# Patient Record
Sex: MALE | Race: WHITE
[De-identification: names, ages, dates, MRNs, and addresses within clinical notes are randomized per-mention and may not be internally consistent; named-entity substitution may affect disease eponyms.]

---

## 2017-02-08 ENCOUNTER — HOSPITAL ENCOUNTER (EMERGENCY)
Dept: HOSPITAL 39 - ER | Age: 27
Discharge: HOME | End: 2017-02-08
Payer: SELF-PAY

## 2017-02-08 VITALS — TEMPERATURE: 97.5 F | SYSTOLIC BLOOD PRESSURE: 141 MMHG | OXYGEN SATURATION: 98 % | DIASTOLIC BLOOD PRESSURE: 91 MMHG

## 2017-02-08 DIAGNOSIS — I25.2: ICD-10-CM

## 2017-02-08 DIAGNOSIS — Z98.61: ICD-10-CM

## 2017-02-08 DIAGNOSIS — Z87.891: ICD-10-CM

## 2017-02-08 DIAGNOSIS — M54.6: Primary | ICD-10-CM

## 2017-02-08 DIAGNOSIS — Z88.6: ICD-10-CM

## 2017-02-08 DIAGNOSIS — D17.1: ICD-10-CM

## 2017-02-08 PROCEDURE — 81001 URINALYSIS AUTO W/SCOPE: CPT

## 2017-02-08 PROCEDURE — 80307 DRUG TEST PRSMV CHEM ANLYZR: CPT

## 2017-02-08 PROCEDURE — 72070 X-RAY EXAM THORAC SPINE 2VWS: CPT

## 2017-02-08 PROCEDURE — 85025 COMPLETE CBC W/AUTO DIFF WBC: CPT

## 2017-02-08 PROCEDURE — 93005 ELECTROCARDIOGRAM TRACING: CPT

## 2017-02-08 PROCEDURE — 36415 COLL VENOUS BLD VENIPUNCTURE: CPT

## 2017-02-08 NOTE — RAD
EXAM DESCRIPTION:

XR THORACIC SPINE 2 VIEWS



CLINICAL HISTORY:

pain



COMPARISON:

None.



TECHNIQUE:

Three views.



FINDINGS:

No evidence for fracture is seen. No dysraphic abnormality is detected.

The pedicles appear intact.



IMPRESSION:

Normal thoracic spine.



Electronically signed by: Ramana Kinney MD 02/08/2017 17:53

## 2017-02-08 NOTE — ED.PDOC
History of Present Illness





- General


Chief Complaint: Back Pain or Injury


Stated Complaint: knot to back


Time Seen by Provider: 02/08/17 17:25


Source: patient


Exam Limitations: no limitations





- History of Present Illness


Timing/Duration: 4-6 hours


Quality/Severity: burning


Back Pain Location: T-spine


Back Pain Radiation: other - none


Method of Injury/Prior Injury: other - none


Improving Factors: nothing


Worsening Factors: nothing


Associated Symptoms: denies symptoms


Allergies/Adverse Reactions: 


Allergies





Hydrocodone Allergy (Verified 02/08/17 16:27)


 








Home Medications: 


Ambulatory Orders





Gabapentin 300 mg PO BID #14 cap 02/08/17 











Review of Systems





- Review of Systems


Constitutional: States: no symptoms reported


EENTM: States: no symptoms reported


Respiratory: States: no symptoms reported


Cardiology: States: no symptoms reported


Gastrointestinal/Abdominal: States: no symptoms reported


Genitourinary: States: no symptoms reported


Musculoskeletal: States: see HPI


Skin: States: no symptoms reported


Neurological: States: no symptoms reported


Endocrine: States: no symptoms reported


Hematologic/Lymphatic: States: no symptoms reported





Past Medical History (General)





- Patient Medical History


Hx Cardiac Disorders: Yes - MI at age 22


Surgical History: other - cardiac stent





- Vaccination History


Hx Tetanus, Diphtheria Vaccination: No


Hx Influenza Vaccination: No


Hx Pneumococcal Vaccination: No


Immunizations Up to Date: Yes





- Social History


Hx Tobacco Use: Yes


Hx Alcohol Use: No


Hx Substance Use: No


Hx Substance Use Treatment: No


Hx Depression: No





- Activities of Daily Living


Hospice Agency (if applicable):: None





- Female History


Patient is a Female of Child Bearing Age (10 -59 yrs old): No


Patient Pregnant: No





Family Medical History





- Family History


  ** Mother


Hx Family Hypertension: Yes


Hx Cardiac Disease: Yes - dad





Physical Exam





- Physical Exam


General Appearance: Alert, No apparent distress


Eyes, Ears, Nose, Throat Exam: PERRL/EOMI, normal ENT inspection, TMs normal


Neck Exam: non-tender, full range of motion, normal alignment, normal inspection


Cardiovascular/Respiratory: regular rate, rhythm, no M/R/G, normal peripheral 

pulses, no JVD, normal breath sounds, no respiratory distress


Gastrointestinal/Abdominal: normal bowel sounds, non tender, soft


Back Exam: normal inspection, no CVA tenderness, no vertebral tenderness


Extremity Exam: no evidence of injury, normal range of motion


Neurologic: no motor/sensory deficits, alert, normal mood/affect, oriented x 3


Skin Exam: normal color, warm/dry, other - lipoma right infrascapular area 2 cm 

dm.





Progress





- Results/Orders


Results/Orders: 


 





02/08/17 17:33


URINE DRUG SCREEN, 7 ASSAY Stat 





02/08/17 18:45


EKG STAT 





02/08/17 19:01


URINALYSIS Stat 








 Laboratory Results











WBC  10.7 K/mm3 (4.8-10.8)   02/08/17  17:58    


 


RBC  5.41 M/mm3 (4.70-6.10)   02/08/17  17:58    


 


Hgb  15.6 gm/dL (14.0-18.0)   02/08/17  17:58    


 


Hct  46.4 % (42.0-52.0)   02/08/17  17:58    


 


MCV  85.7 fl (80.0-94.0)   02/08/17  17:58    


 


MCH  28.8 pg (27.0-31.0)   02/08/17  17:58    


 


MCHC  33.6 g/dL (33.0-37.0)   02/08/17  17:58    


 


RDW  12.7 % (11.5-14.5)   02/08/17  17:58    


 


Plt Count  289 K/mm3 (130-400)   02/08/17  17:58    


 


MPV  8.2 fl (7.40-10.4)   02/08/17  17:58    


 


Absolute Neuts (auto)  6.00 K/uL (1.8-6.8)   02/08/17  17:58    


 


Absolute Lymphs (auto)  3.90 K/uL (1.0-3.4)  H  02/08/17  17:58    


 


Absolute Monos (auto)  0.60 K/uL (0.2-0.8)   02/08/17  17:58    


 


Absolute Eos (auto)  0.10 K/uL (0.0-0.4)   02/08/17  17:58    


 


Absolute Basos (auto)  0.10 K/uL (0.0-0.1)   02/08/17  17:58    


 


Neutrophils %  56.1 % (42.0-78.0)   02/08/17  17:58    


 


Lymphocytes %  36.6 % (20.0-50.0)   02/08/17  17:58    


 


Monocytes %  5.7 % (2.0-9.0)   02/08/17  17:58    


 


Eosinophils %  1.0 % (1.0-5.0)   02/08/17  17:58    


 


Basophils %  0.6 % (0.0-2.0)   02/08/17  17:58    














- EKG/XRAY/CT


EKG: Roque, Sinus - No acute changes


XRAY: Thoracic spine no acute abnormalities noted





Departure





- Departure


Clinical Impression: 


 Thoracic spine pain, Lipoma of back


Time of Disposition: 19:41


Disposition: Discharge to Home or Self Care


Condition: Good


Departure Forms:  ED Discharge - Pt. Copy, Patient Portal Self Enrollment


Instructions:  Lipoma, DI for Low Back Pain


Prescriptions: 


Gabapentin 300 mg PO BID #14 cap


Home Medications: 


Ambulatory Orders





Gabapentin 300 mg PO BID #14 cap 02/08/17 








Additional Instructions: 


NEED TO SIGN UP WITH PRIMARY MD

## 2019-02-09 ENCOUNTER — HOSPITAL ENCOUNTER (EMERGENCY)
Dept: HOSPITAL 39 - ER | Age: 29
Discharge: HOME | End: 2019-02-09
Payer: SELF-PAY

## 2019-02-09 VITALS — OXYGEN SATURATION: 94 % | DIASTOLIC BLOOD PRESSURE: 93 MMHG | TEMPERATURE: 98.9 F | SYSTOLIC BLOOD PRESSURE: 144 MMHG

## 2019-02-09 DIAGNOSIS — Z87.891: ICD-10-CM

## 2019-02-09 DIAGNOSIS — Z88.8: ICD-10-CM

## 2019-02-09 DIAGNOSIS — J11.1: Primary | ICD-10-CM

## 2019-02-09 DIAGNOSIS — Z79.899: ICD-10-CM

## 2019-02-09 DIAGNOSIS — I25.2: ICD-10-CM

## 2019-02-09 PROCEDURE — 94640 AIRWAY INHALATION TREATMENT: CPT

## 2019-02-09 PROCEDURE — 85025 COMPLETE CBC W/AUTO DIFF WBC: CPT

## 2019-02-09 PROCEDURE — 36415 COLL VENOUS BLD VENIPUNCTURE: CPT

## 2019-02-09 PROCEDURE — 71046 X-RAY EXAM CHEST 2 VIEWS: CPT

## 2019-02-09 PROCEDURE — 87502 INFLUENZA DNA AMP PROBE: CPT

## 2019-02-09 NOTE — RAD
EXAM DESCRIPTION: 



Chest,2 Views



CLINICAL HISTORY: 



cough/wheezing 



COMPARISON: 



None



FINDINGS: 



Cardiac silhouette is within normal limits. Abnormal parenchymal

opacity in the right perihilar level compatible with an

infectious process. Recommend follow-up to demonstrate complete

resolution and exclude other etiologies. There is no acute

osseous process visualized.



IMPRESSION: 



Abnormal parenchymal opacity in the right perihilar level

compatible with an infectious process. Recommend follow-up to

demonstrate complete resolution and exclude other etiologies. 



Electronically signed by:  Scot Shane MD  2/9/2019 2:39 PM CST

Workstation: [id number]

## 2019-02-09 NOTE — ED.PDOC
History of Present Illness





- General


Chief Complaint: Respiratory Problem


Stated Complaint: 1 day of cough congestion


Time Seen by Provider: 02/09/19 13:31


Source: patient


Exam Limitations: no limitations





- History of Present Illness


Comments: 


patient comes in today with with onset yesterday of cough, congestion, chest 

tightness and wheezing.  Patient did have subjective fever and chills but no 

nausea or vomiting.  Patient did not get the flu shot this year.  Patient has no

past medical history but did just quit smoking a couple of weeks ago.





Timing/Duration: yesterday


Cough Quality/Degree: severe, dry cough


Possible Cause: no prior episodes


Improving Factors: nothing


Worsening Factors: nothing


Associated Symptoms: cough, fever/chills, muscle aches, nasal congestion, nasal 

drainage, wheezing


Allergies/Adverse Reactions: 


Allergies





Hydrocodone Allergy (Verified 02/09/19 13:50)


   








Home Medications: 


Ambulatory Orders





Gabapentin 300 mg PO BID #14 cap 02/08/17 


Albuterol Inhaler [Ventolin Hfa Inhaler] 108 mcg IN TID #1 inh 02/09/19 


Azithromycin [Zithromax Z-Senthil] 250 mg PO DAILY #6 tab 02/09/19 


Oseltamivir Capsule [Tamiflu] 75 mg PO BID 5 Days #10 capsule 02/09/19 











Review of Systems





- Review of Systems


Constitutional: States: chills, fever, weakness


EENTM: States: nose pain, nose congestion, throat pain


Respiratory: States: cough, short of breath, wheezing


Cardiology: States: no symptoms reported.  Denies: edema, palpitations, syncope


Gastrointestinal/Abdominal: States: no symptoms reported.  Denies: abdominal p

ain, constipation, diarrhea, nausea, vomiting


Genitourinary: States: no symptoms reported





Past Medical History (General)





- Patient Medical History


Hx Cardiac Disorders: Yes - MI at age 22





- Vaccination History


Hx Tetanus, Diphtheria Vaccination: No


Hx Influenza Vaccination: No


Hx Pneumococcal Vaccination: No





- Social History


Hx Tobacco Use: Yes


Hx Alcohol Use: No


Hx Substance Use: No


Hx Substance Use Treatment: No


Hx Depression: No





- Female History


Patient Pregnant: No





Family Medical History





- Family History


  ** Mother


Hx Family Hypertension: Yes


Hx Cardiac Disease: Yes - dad





Physical Exam





- Physical Exam


General Appearance: Alert, No apparent distress


Eye Exam: bilateral normal


ENT Exam: hearing grossly normal, TMs normal, nasal congestion, nasal drainage, 

pharyngeal erythema


Neck: non-tender, full range of motion, supple, normal inspection


Respiratory: wheezing - in all fields with no crackles 


Cardiovascular/Chest: normal peripheral pulses, regular rate, rhythm, no edema, 

no gallop


Gastrointestinal/Abdominal: normal bowel sounds, non tender, soft


Extremity: non-tender, normal inspection


Neurologic: alert, oriented x 3





Progress





- Progress


Progress: 


discussed with patient positive flu and need to stop smoking.  Patient has 

restarted that processes and has not smoked for a couple of weeks.  Patient 

understands his chest x-ray shows a possible early infiltrate that needs to be 

followed up to make sure it does resolve.  We've given him Tamiflu as well as a 

breathing treatment here and inhaler to go home with and a Z-Senthil.  Patient is to

 follow-up with his PCP.  


02/09/19 14:47








- Results/Orders


Results/Orders: 





                                        





02/09/19 14:04


INFLUENZA A & B BY PCR Stat 








                               Laboratory Results











WBC  9.1 K/mm3 (4.8-10.8)   02/09/19  13:42    


 


RBC  5.03 M/mm3 (4.70-6.10)   02/09/19  13:42    


 


Hgb  15.0 gm/dL (14.0-18.0)   02/09/19  13:42    


 


Hct  43.6 % (42.0-52.0)   02/09/19  13:42    


 


MCV  86.7 fl (80.0-94.0)   02/09/19  13:42    


 


MCH  29.8 pg (27.0-31.0)   02/09/19  13:42    


 


MCHC  34.4 g/dL (33.0-37.0)   02/09/19  13:42    


 


RDW  13.1 % (11.5-14.5)   02/09/19  13:42    


 


Plt Count  236 K/mm3 (130-400)   02/09/19  13:42    


 


MPV  8.2 fl (7.40-10.4)   02/09/19  13:42    


 


Absolute Neuts (auto)  6.40 K/uL (1.8-6.8)   02/09/19  13:42    


 


Absolute Lymphs (auto)  1.60 K/uL (1.0-3.4)   02/09/19  13:42    


 


Absolute Monos (auto)  1.00 K/uL (0.2-0.8)  H  02/09/19  13:42    


 


Absolute Eos (auto)  0.00 K/uL (0.0-0.4)   02/09/19  13:42    


 


Absolute Basos (auto)  0.00 K/uL (0.0-0.1)   02/09/19  13:42    


 


Neutrophils %  70.4 % (42.0-78.0)   02/09/19  13:42    


 


Lymphocytes %  17.7 % (20.0-50.0)  L  02/09/19  13:42    


 


Monocytes %  11.2 % (2.0-9.0)  H  02/09/19  13:42    


 


Eosinophils %  0.4 % (1.0-5.0)  L  02/09/19  13:42    


 


Basophils %  0.3 % (0.0-2.0)   02/09/19  13:42    








Patient Name: BERNY NOBLE


Patient ID: [id number]


Gender: Male


YOB: 1990


Home Phone: [phone]


Referring Physician: LAUREN CHA


Organization: [facility]


Accession Number: [id number]


Requested Date: February 9, 2019 13:31


Report Status: Final


Requested Procedure: 1


Procedure Description: Chest,2 Views


Modality: CR


Findings


Reporting MD: Scot Shane


Fellow MD: Not available


Dictation Time:


: Not available


Transcription Date:


EXAM DESCRIPTION:


Chest,2 Views


CLINICAL HISTORY:


cough/wheezing


COMPARISON:


None


FINDINGS:


Cardiac silhouette is within normal limits. Abnormal parenchymal


opacity in the right perihilar level compatible with an


infectious process. Recommend follow-up to demonstrate complete


resolution and exclude other etiologies. There is no acute


osseous process visualized.


IMPRESSION:


Abnormal parenchymal opacity in the right perihilar level


compatible with an infectious process. Recommend follow-up to


demonstrate complete resolution and exclude other etiologies.





Influ A positive





Departure





- Departure


Clinical Impression: 


 Influenza





Disposition: Discharge to Home or Self Care


Condition: Good


Departure Forms:  ED Discharge - Pt. Copy, Patient Portal Self Enrollment


Prescriptions: 


Albuterol Inhaler [Ventolin Hfa Inhaler] 108 mcg IN TID #1 inh


Azithromycin [Zithromax Z-Senthil] 250 mg PO DAILY #6 tab


Oseltamivir Capsule [Tamiflu] 75 mg PO BID 5 Days #10 capsule


Home Medications: 


Ambulatory Orders





Gabapentin 300 mg PO BID #14 cap 02/08/17 


Albuterol Inhaler [Ventolin Hfa Inhaler] 108 mcg IN TID #1 inh 02/09/19 


Azithromycin [Zithromax Z-Senthil] 250 mg PO DAILY #6 tab 02/09/19 


Oseltamivir Capsule [Tamiflu] 75 mg PO BID 5 Days #10 capsule 02/09/19 








Additional Instructions: 


Follow-up in one to 2 weeks to recheck chest x-ray, return to  ER for shortness 

of breath severe worsening of symptoms.

## 2020-02-23 ENCOUNTER — HOSPITAL ENCOUNTER (EMERGENCY)
Dept: HOSPITAL 39 - ER | Age: 30
Discharge: HOME | End: 2020-02-23
Payer: SELF-PAY

## 2020-02-23 VITALS — OXYGEN SATURATION: 99 % | DIASTOLIC BLOOD PRESSURE: 86 MMHG | SYSTOLIC BLOOD PRESSURE: 142 MMHG | TEMPERATURE: 97.3 F

## 2020-02-23 DIAGNOSIS — R07.89: ICD-10-CM

## 2020-02-23 DIAGNOSIS — R10.9: Primary | ICD-10-CM

## 2020-02-23 PROCEDURE — 80048 BASIC METABOLIC PNL TOTAL CA: CPT

## 2020-02-23 PROCEDURE — 93005 ELECTROCARDIOGRAM TRACING: CPT

## 2020-02-23 PROCEDURE — 36415 COLL VENOUS BLD VENIPUNCTURE: CPT

## 2020-02-23 PROCEDURE — 71046 X-RAY EXAM CHEST 2 VIEWS: CPT

## 2020-02-23 PROCEDURE — 83690 ASSAY OF LIPASE: CPT

## 2020-02-23 PROCEDURE — 85025 COMPLETE CBC W/AUTO DIFF WBC: CPT

## 2020-02-23 PROCEDURE — 81001 URINALYSIS AUTO W/SCOPE: CPT

## 2020-02-23 PROCEDURE — 80076 HEPATIC FUNCTION PANEL: CPT

## 2020-02-23 RX ADMIN — ONDANSETRON ONE MG: 2 INJECTION INTRAMUSCULAR; INTRAVENOUS at 06:03

## 2020-02-23 RX ADMIN — MORPHINE SULFATE ONE MG: 10 INJECTION INTRAVENOUS at 06:03

## 2020-02-23 RX ADMIN — Medication PRN MLS/HR: at 06:02

## 2020-02-23 RX ADMIN — Medication PRN ML: at 06:03

## 2020-02-23 NOTE — ED.PDOC
History of Present Illness





- General


Chief Complaint:  Problem


Stated Complaint: Left flank pain


Time Seen by Provider: 02/23/20 05:10


Source: patient, RN notes reviewed, Vital Signs reviewed


Exam Limitations: no limitations





- History of Present Illness


Initial Comments: 





This is a 29-year-old male with no significant past medical history presenting 

with sudden onset left flank pain, left lateral chest wall pain onset 1 hour 

prior to arrival.  No radiation to the testicles.  He denies any trauma.  No 

hematuria.  No history of kidney stones.  He denies any recent cough/URI 

symptoms.  Worse with movement and deep breathing.  No history of similar pain 

in the past.


Timing/Duration: 1 hour


Severity: moderate


Improving Factors: rest


Worsening Factors: movement


Associated Symptoms: denies symptoms


Allergies/Adverse Reactions: 


Allergies





Hydrocodone Allergy (Verified 02/09/19 13:50)


   





Home Medications: 


Ambulatory Orders





Aspirin [Aspirin Low Strength] 81 mg PO DAILY 02/23/20 


Ibuprofen [Motrin] 400 mg PO Q6H PRN #20 tab 02/23/20 


Methocarbamol [Robaxin] 750 mg PO Q6H PRN #20 tab 02/23/20 


Tramadol HCl 50 mg PO Q6H PRN #20 tab 02/23/20 











Review of Systems





- Review of Systems


Constitutional: Denies: chills, fever


EENTM: States: no symptoms reported


Respiratory: Denies: cough, short of breath, wheezing


Cardiology: States: chest pain.  Denies: edema, palpitations


Genitourinary: Denies: dysuria, hematuria, pain


Musculoskeletal: Denies: back pain, joint pain, joint swelling, muscle pain


Skin: Denies: lesions, rash


Endocrine: States: no symptoms reported


All other Systems: Reviewed and Negative





Past Medical History (General)





- Patient Medical History


Hx Seizures: No


Hx Stroke: No


Hx Dementia: No


Hx Asthma: No


Hx of COPD: No


Hx Cardiac Disorders: No


Hx Congestive Heart Failure: No


Hx Pacemaker: No


Hx Hypertension: No


Hx Thyroid Disease: No


Hx Diabetes: No


Hx Gastroesophageal Reflux: No


Hx Renal Disease: No


Hx Cancer: No


Hx of HIV: No


Hx Hepatitis C: No


Hx MRSA: No


Surgical History: other





- Vaccination History


Hx Tetanus, Diphtheria Vaccination: Yes


Hx Influenza Vaccination: No


Hx Pneumococcal Vaccination: No





- Social History


Hx Tobacco Use: No


Hx Chewing Tobacco Use: No


Hx Alcohol Use: No


Hx Substance Use: No


Hx Substance Use Treatment: No


Hx Depression: No


Feels Threatened In Home Enviroment: No


Feels Threatened In a Relationship: No


Hx Physical Abuse: No


Hx Emotional Abuse: No


Hx Suspected Abuse: No





- Female History


Patient is a Female of Child Bearing Age (10 -59 yrs old): No


Patient Pregnant: No





Family Medical History





- Family History


  ** Mother


Hx Family Hypertension: Yes


Hx Cardiac Disease: Yes - dad





Physical Exam





- Physical Exam


General Appearance: Alert, Comfortable, No apparent distress


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 

several years


Ears, Nose, Throat: normal ENT inspection, normal pharynx


Neck: non-tender, full range of motion, supple


Respiratory: lungs clear, normal breath sounds, no respiratory distress, no 

accessory muscle use, other - Reproducible tenderness in the left lateral ribs, 

no crepitus, no skin changes, no subcutaenous emphysema


Cardiovascular/Chest: regular rate, rhythm, no edema, no JVD


Gastrointestinal/Abdominal: soft, tenderness - Mild left upper quadrant, no 

rebound, no guarding.  No CVA tenderness


Back Exam: no CVA tenderness, no vertebral tenderness


Extremity: normal range of motion, non-tender, normal inspection, no pedal 

edema, no calf tenderness


Neurologic: no motor/sensory deficits, alert, oriented x 3


Skin Exam: normal color, warm/dry





Progress





- Results/Orders


Results/Orders: 





CXR showed no acute process





                                Laboratory Tests











  02/23/20 02/23/20 02/23/20





  05:32 05:32 06:54


 


WBC  8.8  


 


RBC  5.31  


 


Hgb  15.7  


 


Hct  46.3  


 


MCV  87.2  


 


MCH  29.6  


 


MCHC  33.9  


 


RDW  13.1  


 


Plt Count  302  


 


MPV  8.4  


 


Absolute Neuts (auto)  5.40  


 


Absolute Lymphs (auto)  2.60  


 


Absolute Monos (auto)  0.60  


 


Absolute Eos (auto)  0.10  


 


Absolute Basos (auto)  0.10  


 


Neutrophils %  61.3  


 


Lymphocytes %  30.2  


 


Monocytes %  6.7  


 


Eosinophils %  1.0  


 


Basophils %  0.8  


 


Sodium   140 


 


Potassium   3.9 


 


Chloride   104 


 


Carbon Dioxide   25 


 


Anion Gap   14.9 


 


BUN   13 


 


Creatinine   0.88 


 


BUN/Creatinine Ratio   14.8 


 


Random Glucose   91 


 


Serum Osmolality   279.1 


 


Calcium   10.2 


 


Total Bilirubin   0.8 


 


Direct Bilirubin   0.1 


 


Indirect Bilirubin   0.7 


 


AST   25 


 


ALT   29 


 


Alkaline Phosphatase   123 H 


 


Serum Total Protein   7.7 


 


Albumin   4.3 


 


Lipase   39 


 


Urine Color    Yellow


 


Urine Appearance    Clear


 


Urine pH    8.5 H


 


Ur Specific Gravity    1.020


 


Urine Protein    Negative


 


Urine Glucose (UA)    Negative


 


Urine Ketones    Negative


 


Urine Blood    Negative


 


Urine Nitrite    Negative


 


Urine Bilirubin    Negative


 


Urine Urobilinogen    1.0


 


Ur Leukocyte Esterase    Negative


 


Urine RBC    0


 


Urine WBC    0


 


Ur Epithelial Cells    0


 


Urine Bacteria    0














Departure





- Departure


Clinical Impression: 


 Left flank pain





Disposition: Discharge to Home or Self Care


Condition: Good


Departure Forms:  ED Discharge - Pt. Copy, Patient Portal Self Enrollment


Instructions:  Flank Pain (DC), Pleuritic Chest Pain (DC)


Diet: resume usual diet


Activity: increase activity as tolerated


Prescriptions: 


Ibuprofen [Motrin] 400 mg PO Q6H PRN #20 tab


 PRN Reason: Pain


Methocarbamol [Robaxin] 750 mg PO Q6H PRN #20 tab


 PRN Reason: Pain


Tramadol HCl 50 mg PO Q6H PRN #20 tab


 PRN Reason: Severe Pain


Home Medications: 


Ambulatory Orders





Aspirin [Aspirin Low Strength] 81 mg PO DAILY 02/23/20 


Ibuprofen [Motrin] 400 mg PO Q6H PRN #20 tab 02/23/20 


Methocarbamol [Robaxin] 750 mg PO Q6H PRN #20 tab 02/23/20 


Tramadol HCl 50 mg PO Q6H PRN #20 tab 02/23/20

## 2020-02-23 NOTE — RAD
CHEST, TWO VIEW, XR 



CLINICAL HISTORY:  left rib and chest wall pain



COMPARISON: 2/19/2019



TECHNIQUE: Frontal and lateral Chest.



FINDINGS: 



[Normal cardiac size. Pulmonary vasculature appears normal.

Normal cardiomediastinal contours. Lungs are clear. Pleural

spaces are clear.



Unremarkable soft tissues and bones.]



IMPRESSION:



1. Normal chest.



Electronically signed by:  Anneliese Ratliff DO  2/23/2020 5:53 AM CST

Workstation: [id number]

## 2021-03-02 ENCOUNTER — HOSPITAL ENCOUNTER (EMERGENCY)
Dept: HOSPITAL 39 - ER | Age: 31
Discharge: HOME | End: 2021-03-02
Payer: SELF-PAY

## 2021-03-02 VITALS — TEMPERATURE: 97.7 F | DIASTOLIC BLOOD PRESSURE: 80 MMHG | SYSTOLIC BLOOD PRESSURE: 131 MMHG

## 2021-03-02 VITALS — OXYGEN SATURATION: 99 %

## 2021-03-02 DIAGNOSIS — R51.9: Primary | ICD-10-CM

## 2021-03-02 DIAGNOSIS — R05: ICD-10-CM

## 2021-03-02 DIAGNOSIS — Z20.822: ICD-10-CM

## 2021-03-02 DIAGNOSIS — M79.10: ICD-10-CM

## 2021-03-02 DIAGNOSIS — R11.0: ICD-10-CM

## 2021-03-02 DIAGNOSIS — Z88.5: ICD-10-CM

## 2021-03-02 PROCEDURE — 87581 M.PNEUMON DNA AMP PROBE: CPT

## 2021-03-02 PROCEDURE — 80048 BASIC METABOLIC PNL TOTAL CA: CPT

## 2021-03-02 PROCEDURE — 71045 X-RAY EXAM CHEST 1 VIEW: CPT

## 2021-03-02 PROCEDURE — 85025 COMPLETE CBC W/AUTO DIFF WBC: CPT

## 2021-03-02 PROCEDURE — 87633 RESP VIRUS 12-25 TARGETS: CPT

## 2021-03-02 PROCEDURE — 36415 COLL VENOUS BLD VENIPUNCTURE: CPT

## 2021-03-02 PROCEDURE — 87635 SARS-COV-2 COVID-19 AMP PRB: CPT

## 2021-03-02 PROCEDURE — 87486 CHLMYD PNEUM DNA AMP PROBE: CPT

## 2021-03-02 NOTE — RAD
EXAM DESCRIPTION: Chest,1 View



CLINICAL HISTORY: 30 years Male, cough, fever



COMPARISON: Previous study February 23, 2020



TECHNIQUE: AP portable chest.



FINDINGS:



Heart size is normal with normal pulmonary vascularity.



No consolidating infiltrate.



No pulmonary mass or worrisome nodule.



No pneumothorax or pleural effusion.



Bones are unremarkable.



IMPRESSION:



No acute process is identified in the chest.



Electronically signed by:  Juvenal Gutierrez MD  3/2/2021 3:08 PM

CST Workstation: [id number]

## 2021-03-02 NOTE — ED.PDOC
History of Present Illness





- General


Chief Complaint: General


Time Seen by Provider: 03/02/21 14:46


Source: patient, RN notes reviewed, Vital Signs reviewed


Exam Limitations: no limitations





- History of Present Illness


Initial Comments: 





The patient is a 30 year old with no significant past medical history who 

presents to the ED complaining of two days of right sided headache, body aches, 

and nausea. He has mild non-productive cough. No shortness of breath, fever, or 

chills. He has some nausea, no vomiting or diarrhea. He has been taking tylenol 

for headache and pepto-bismol for the nausea. He has had COVID-19 exposure while

at work. He has not been vaccinated for COVID-19. No other complaints at this 

time. 


Allergies/Adverse Reactions: 


Allergies





Hydrocodone Allergy (Verified 02/09/19 13:50)


   





Home Medications: 


Ambulatory Orders





Ondansetron HCl [Zofran] 4 mg PO Q6HR PRN #15 tab 03/02/21 











Review of Systems





- Review of Systems


Constitutional: States: malaise


EENTM: States: no symptoms reported


Respiratory: States: cough.  Denies: short of breath, wheezing


Cardiology: Denies: chest pain, palpitations


Gastrointestinal/Abdominal: States: nausea.  Denies: abdominal pain, 

constipation, diarrhea, vomiting


Genitourinary: States: no symptoms reported


Musculoskeletal: States: back pain, muscle pain, muscle stiffness


Skin: States: no symptoms reported


Neurological: States: headache.  Denies: paresthesia, weakness


Endocrine: States: no symptoms reported


Hematologic/Lymphatic: States: no symptoms reported


All other Systems: Reviewed and Negative





Past Medical History (General)





- Patient Medical History


Hx Seizures: No


Hx Stroke: No


Hx Dementia: No


Hx Asthma: No


Hx of COPD: No


Hx Cardiac Disorders: No


Hx Congestive Heart Failure: No


Hx Pacemaker: No


Hx Hypertension: No


Hx Thyroid Disease: No


Hx Diabetes: No


Hx Gastroesophageal Reflux: No


Hx Renal Disease: No


Hx Cancer: No


Hx of HIV: No


Hx Hepatitis C: No


Hx MRSA: No





- Vaccination History


Hx Tetanus, Diphtheria Vaccination: Yes


Hx Influenza Vaccination: No


Hx Pneumococcal Vaccination: No





- Social History


Hx Tobacco Use: No


Hx Chewing Tobacco Use: No


Hx Alcohol Use: No


Hx Substance Use: No


Hx Substance Use Treatment: No


Hx Depression: No


Hx Physical Abuse: No


Hx Emotional Abuse: No


Hx Suspected Abuse: No





- Female History


Patient Pregnant: No





Family Medical History





- Family History


  ** Mother


Hx Family Hypertension: Yes


Hx Cardiac Disease: Yes - dad





Physical Exam





- Physical Exam


General Appearance: Comfortable, No apparent distress, Well Developed, Well 

Groomed, Well Nourished


Eye Exam: bilateral normal


Ears, Nose, Throat: hearing grossly normal, normal ENT inspection, normal 

pharynx


Neck: full range of motion, supple, other - No meningeal signs


Respiratory: lungs clear, normal breath sounds, no respiratory distress, no 

accessory muscle use


Cardiovascular/Chest: normal peripheral pulses, regular rate, rhythm, no edema, 

no murmur


Gastrointestinal/Abdominal: soft, tenderness - mild, diffuse. No focal 

tenderness


Rectal Exam: normal exam


Back Exam: normal inspection


Extremity: normal range of motion, non-tender, normal inspection, no pedal 

edema, no calf tenderness


Neurologic: no motor/sensory deficits, normal mood/affect, oriented x 3





Progress





- Progress


Progress: 





03/02/21 17:41


Patient reassessed, workup as below. Headache has resolved, no vomiting or 

diarrhea in the ED. He is feeling well. COVID PCR negative. Will continue 

outpatient symptomatic management with oral hydration, anti-emetics. He will 

follow up with his PCP. Home care instructions and return indications reviewed. 





- Results/Orders


Results/Orders: 





                         Laboratory Results - last 24 hr











  03/02/21 03/02/21





  14:55 14:55


 


WBC  10.7 


 


RBC  5.25 


 


Hgb  15.7 


 


Hct  46.7 


 


MCV  89.0 


 


MCH  29.9 


 


MCHC  33.6 


 


RDW  13.3 


 


Plt Count  322 


 


MPV  7.8 


 


Absolute Neuts (auto)  7.30 H 


 


Absolute Lymphs (auto)  2.70 


 


Absolute Monos (auto)  0.50 


 


Absolute Eos (auto)  0.10 


 


Absolute Basos (auto)  0.10 


 


Neutrophils %  68.4 


 


Lymphocytes %  25.6 


 


Monocytes %  4.9 


 


Eosinophils %  0.6 L 


 


Basophils %  0.5 


 


Sodium   139


 


Potassium   4.3


 


Chloride   103


 


Carbon Dioxide   27


 


Anion Gap   13.3


 


BUN   22 H


 


Creatinine   0.89


 


BUN/Creatinine Ratio   24.7 H


 


Random Glucose   88


 


Serum Osmolality   280.3


 


Calcium   9.2














Departure





- Departure


Clinical Impression: 


Headache


Qualifiers:


 Headache type: unspecified Headache chronicity pattern: acute headache 

Intractability: not intractable Qualified Code(s): R51.9 - Headache, unspecified





Time of Disposition: 17:44


Disposition: Discharge to Home or Self Care


Condition: Good


Departure Forms:  ED Discharge - Pt. Copy, Patient Portal Self Enrollment


Instructions:  Nausea and Vomiting, Adult (DC), Headache, Adult (DC)


Diet: resume usual diet


Activity: increase activity as tolerated


Prescriptions: 


Ondansetron HCl [Zofran] 4 mg PO Q6HR PRN #15 tab


 PRN Reason: Nausea


Home Medications: 


Ambulatory Orders





Ondansetron HCl [Zofran] 4 mg PO Q6HR PRN #15 tab 03/02/21